# Patient Record
Sex: FEMALE | Race: WHITE | NOT HISPANIC OR LATINO | URBAN - NONMETROPOLITAN AREA
[De-identification: names, ages, dates, MRNs, and addresses within clinical notes are randomized per-mention and may not be internally consistent; named-entity substitution may affect disease eponyms.]

---

## 2020-12-03 ENCOUNTER — OFFICE VISIT (OUTPATIENT)
Dept: URBAN - NONMETROPOLITAN AREA CLINIC 2 | Facility: CLINIC | Age: 55
End: 2020-12-03
Payer: MEDICARE

## 2020-12-03 DIAGNOSIS — Z72.0 TOBACCO USE: ICD-10-CM

## 2020-12-03 DIAGNOSIS — R11.2 NAUSEA & VOMITING: ICD-10-CM

## 2020-12-03 DIAGNOSIS — R10.13 EPIGASTRIC PAIN: ICD-10-CM

## 2020-12-03 DIAGNOSIS — Z12.11 COLON CANCER SCREENING: ICD-10-CM

## 2020-12-03 DIAGNOSIS — F12.90 MARIJUANA USE: ICD-10-CM

## 2020-12-03 PROCEDURE — 4004F PT TOBACCO SCREEN RCVD TLK: CPT | Performed by: INTERNAL MEDICINE

## 2020-12-03 PROCEDURE — 3017F COLORECTAL CA SCREEN DOC REV: CPT | Performed by: INTERNAL MEDICINE

## 2020-12-03 PROCEDURE — 99204 OFFICE O/P NEW MOD 45 MIN: CPT | Performed by: INTERNAL MEDICINE

## 2020-12-03 PROCEDURE — G8427 DOCREV CUR MEDS BY ELIG CLIN: HCPCS | Performed by: INTERNAL MEDICINE

## 2020-12-03 NOTE — HPI-TODAY'S VISIT:
55 year old female with past medical history of anxiety/depression, marijuana use, chronic tobacco use, who presents with a 2 year history of intermittent nausea/vomiting/abdominal pain.   Ms. Pillai has experienced episodes of nausea/vomiting/epigastric discomfort which last for approximately 2-3 days with subsequent resolution for 2-3 months. She denies any symptoms of nausea/vomiting between her 2-3 day episodes of nausea/vomiting/epigastric discomfort. She does not recall a particular trigger of her symptoms and cannot correlate the development of symptoms with any particular food. As noted above, she does use marijuana daily and is wondering if the marijuana is leading to her symptoms. Denies use of opioid pain medication. She does use NSAIDs for her chronic back pain. She denies melena, hematochezia, weight loss.   She has undergone evaluation as noted below with abdominal ultrasound which showed echogenic enlarged liver suggesting fatty infiltration.   She denies having undergone an EGD or colonoscopy.   Prior Evaluation:  1/7/2020: Right Upper Quadrant Ultrasound IMPRESSION: Impression: Echogenic enlarged liver suggesting fatty infiltration. No focal acute finding. 11/7/2020: CBC with differential normal. Sodium low at 132, potassium 3.4, chloride 93, bicarbonate 28, BUN 30, creatinine 0.96. Calcium elevated at 10.4. LFTs normal.

## 2020-12-05 LAB
A/G RATIO: 1.6
ALBUMIN: 4.7
ALKALINE PHOSPHATASE: 73
ALT (SGPT): 7
AST (SGOT): 15
BASO (ABSOLUTE): 0
BASOS: 1
BILIRUBIN, TOTAL: 0.6
BUN/CREATININE RATIO: 17
BUN: 13
CALCIUM: 10.1
CARBON DIOXIDE, TOTAL: 26
CHLORIDE: 101
CREATININE: 0.75
EGFR IF AFRICN AM: 104
EGFR IF NONAFRICN AM: 90
ENDOMYSIAL ANTIBODY IGA: NEGATIVE
EOS (ABSOLUTE): 0.1
EOS: 1
GLOBULIN, TOTAL: 3
GLUCOSE: 88
HEMATOCRIT: 45.5
HEMATOLOGY COMMENTS:: (no result)
HEMOGLOBIN: 15.5
IMMATURE CELLS: (no result)
IMMATURE GRANS (ABS): 0
IMMATURE GRANULOCYTES: 0
IMMUNOGLOBULIN A, QN, SERUM: 226
LYMPHS (ABSOLUTE): 2.6
LYMPHS: 41
MCH: 30.9
MCHC: 34.1
MCV: 91
MONOCYTES(ABSOLUTE): 0.5
MONOCYTES: 8
NEUTROPHILS (ABSOLUTE): 3.2
NEUTROPHILS: 49
NRBC: (no result)
PLATELETS: 265
POTASSIUM: 4.3
PROTEIN, TOTAL: 7.7
RBC: 5.01
RDW: 12.6
SODIUM: 142
T-TRANSGLUTAMINASE (TTG) IGA: <2
TSH: 3.77
WBC: 6.4

## 2021-01-14 ENCOUNTER — OFFICE VISIT (OUTPATIENT)
Dept: URBAN - NONMETROPOLITAN AREA SURGERY CENTER 1 | Facility: SURGERY CENTER | Age: 56
End: 2021-01-14

## 2021-01-29 ENCOUNTER — OFFICE VISIT (OUTPATIENT)
Dept: URBAN - NONMETROPOLITAN AREA CLINIC 2 | Facility: CLINIC | Age: 56
End: 2021-01-29

## 2021-05-26 PROBLEM — 275978004 COLON CANCER SCREENING: Status: ACTIVE | Noted: 2020-12-03

## 2021-05-26 PROBLEM — 16932000 NAUSEA AND VOMITING: Status: ACTIVE | Noted: 2020-12-03

## 2021-05-26 PROBLEM — 191891003 NONDEPENDENT CANNABIS ABUSE: Status: ACTIVE | Noted: 2020-12-03

## 2021-05-26 PROBLEM — 79922009 EPIGASTRIC PAIN: Status: ACTIVE | Noted: 2020-12-03

## 2021-06-10 ENCOUNTER — OFFICE VISIT (OUTPATIENT)
Dept: URBAN - NONMETROPOLITAN AREA SURGERY CENTER 1 | Facility: SURGERY CENTER | Age: 56
End: 2021-06-10

## 2021-07-15 ENCOUNTER — OFFICE VISIT (OUTPATIENT)
Dept: URBAN - NONMETROPOLITAN AREA SURGERY CENTER 1 | Facility: SURGERY CENTER | Age: 56
End: 2021-07-15

## 2023-12-13 ENCOUNTER — LAB OUTSIDE AN ENCOUNTER (OUTPATIENT)
Dept: URBAN - NONMETROPOLITAN AREA CLINIC 2 | Facility: CLINIC | Age: 58
End: 2023-12-13

## 2023-12-13 ENCOUNTER — OFFICE VISIT (OUTPATIENT)
Dept: URBAN - NONMETROPOLITAN AREA CLINIC 2 | Facility: CLINIC | Age: 58
End: 2023-12-13
Payer: MEDICARE

## 2023-12-13 ENCOUNTER — DASHBOARD ENCOUNTERS (OUTPATIENT)
Age: 58
End: 2023-12-13

## 2023-12-13 VITALS
SYSTOLIC BLOOD PRESSURE: 133 MMHG | HEIGHT: 67 IN | WEIGHT: 145 LBS | TEMPERATURE: 98 F | HEART RATE: 105 BPM | DIASTOLIC BLOOD PRESSURE: 81 MMHG | BODY MASS INDEX: 22.76 KG/M2

## 2023-12-13 DIAGNOSIS — R11.0 NAUSEA: ICD-10-CM

## 2023-12-13 DIAGNOSIS — Z80.0 FAMILY HISTORY OF COLON CANCER: ICD-10-CM

## 2023-12-13 DIAGNOSIS — R10.13 EPIGASTRIC PAIN: ICD-10-CM

## 2023-12-13 PROCEDURE — 99204 OFFICE O/P NEW MOD 45 MIN: CPT | Performed by: NURSE PRACTITIONER

## 2023-12-13 RX ORDER — PANTOPRAZOLE SODIUM 40 MG/1
1 TABLET TABLET, DELAYED RELEASE ORAL TWICE DAILY
Qty: 60 TABLET | Refills: 6 | OUTPATIENT
Start: 2023-12-13

## 2023-12-13 RX ORDER — ONDANSETRON 4 MG/1
1 TABLET ON THE TONGUE AND ALLOW TO DISSOLVE TABLET, ORALLY DISINTEGRATING ORAL
Qty: 30 TABLET | Refills: 0 | OUTPATIENT
Start: 2023-12-13

## 2023-12-13 RX ORDER — SODIUM, POTASSIUM,MAG SULFATES 17.5-3.13G
AS DIRECTED SOLUTION, RECONSTITUTED, ORAL ORAL
Qty: 1 | Refills: 0 | OUTPATIENT
Start: 2023-12-13 | End: 2023-12-15

## 2023-12-13 NOTE — HPI-TODAY'S VISIT:
Erica is a 58-year-old female who presents with epigastric pain, nausea, and vomiting.  She reports symptoms been ongoing intermittently for the last 3 months.  Sometimes they will occur postprandial and other times she will just randomly wake up at night vomiting.  She does report greasy foods tend to set her symptoms off.  Symptoms started off as this epigastric bloating discomfort and ultimately ended irretractable nausea and vomiting that last for a few days and then resolved.  She had 3 ER visits to [facility], [facility] and [facility] in recent months for these complaints.  She did have a CT at [facility], which she reports is normal, but unable to review.  No prior colonoscopy and does have a family history of colon cancer. Sb

## 2024-03-07 ENCOUNTER — COL/EGD (OUTPATIENT)
Dept: URBAN - NONMETROPOLITAN AREA SURGERY CENTER 1 | Facility: SURGERY CENTER | Age: 59
End: 2024-03-07

## 2024-04-02 ENCOUNTER — COL/EGD (OUTPATIENT)
Dept: URBAN - NONMETROPOLITAN AREA SURGERY CENTER 1 | Facility: SURGERY CENTER | Age: 59
End: 2024-04-02

## 2024-04-17 ENCOUNTER — OV EP (OUTPATIENT)
Dept: URBAN - NONMETROPOLITAN AREA CLINIC 2 | Facility: CLINIC | Age: 59
End: 2024-04-17

## 2024-05-29 ENCOUNTER — OFFICE VISIT (OUTPATIENT)
Dept: URBAN - NONMETROPOLITAN AREA SURGERY CENTER 1 | Facility: SURGERY CENTER | Age: 59
End: 2024-05-29

## 2024-06-11 ENCOUNTER — OFFICE VISIT (OUTPATIENT)
Dept: URBAN - NONMETROPOLITAN AREA CLINIC 2 | Facility: CLINIC | Age: 59
End: 2024-06-11

## 2024-07-19 ENCOUNTER — OFFICE VISIT (OUTPATIENT)
Dept: URBAN - NONMETROPOLITAN AREA SURGERY CENTER 1 | Facility: SURGERY CENTER | Age: 59
End: 2024-07-19

## 2024-08-07 ENCOUNTER — OFFICE VISIT (OUTPATIENT)
Dept: URBAN - NONMETROPOLITAN AREA CLINIC 2 | Facility: CLINIC | Age: 59
End: 2024-08-07

## 2024-08-27 ENCOUNTER — OFFICE VISIT (OUTPATIENT)
Dept: URBAN - NONMETROPOLITAN AREA SURGERY CENTER 1 | Facility: SURGERY CENTER | Age: 59
End: 2024-08-27